# Patient Record
Sex: FEMALE | Race: WHITE | NOT HISPANIC OR LATINO
[De-identification: names, ages, dates, MRNs, and addresses within clinical notes are randomized per-mention and may not be internally consistent; named-entity substitution may affect disease eponyms.]

---

## 2019-03-31 ENCOUNTER — TRANSCRIPTION ENCOUNTER (OUTPATIENT)
Age: 59
End: 2019-03-31

## 2019-08-19 ENCOUNTER — TRANSCRIPTION ENCOUNTER (OUTPATIENT)
Age: 59
End: 2019-08-19

## 2019-12-26 ENCOUNTER — TRANSCRIPTION ENCOUNTER (OUTPATIENT)
Age: 59
End: 2019-12-26

## 2020-08-01 ENCOUNTER — TRANSCRIPTION ENCOUNTER (OUTPATIENT)
Age: 60
End: 2020-08-01

## 2020-08-21 ENCOUNTER — TRANSCRIPTION ENCOUNTER (OUTPATIENT)
Age: 60
End: 2020-08-21

## 2023-05-09 ENCOUNTER — APPOINTMENT (OUTPATIENT)
Dept: SURGERY | Facility: CLINIC | Age: 63
End: 2023-05-09

## 2023-09-21 ENCOUNTER — NON-APPOINTMENT (OUTPATIENT)
Age: 63
End: 2023-09-21

## 2023-09-21 ENCOUNTER — APPOINTMENT (OUTPATIENT)
Dept: VASCULAR SURGERY | Facility: CLINIC | Age: 63
End: 2023-09-21
Payer: COMMERCIAL

## 2023-09-21 VITALS — SYSTOLIC BLOOD PRESSURE: 106 MMHG | DIASTOLIC BLOOD PRESSURE: 74 MMHG | HEART RATE: 79 BPM

## 2023-09-21 DIAGNOSIS — Z82.49 FAMILY HISTORY OF ISCHEMIC HEART DISEASE AND OTHER DISEASES OF THE CIRCULATORY SYSTEM: ICD-10-CM

## 2023-09-21 DIAGNOSIS — M79.662 PAIN IN LEFT LOWER LEG: ICD-10-CM

## 2023-09-21 DIAGNOSIS — I82.409 ACUTE EMBOLISM AND THROMBOSIS OF UNSPECIFIED DEEP VEINS OF UNSPECIFIED LOWER EXTREMITY: ICD-10-CM

## 2023-09-21 DIAGNOSIS — I82.890 ACUTE EMBOLISM AND THROMBOSIS OF OTHER SPECIFIED VEINS: ICD-10-CM

## 2023-09-21 DIAGNOSIS — Z78.9 OTHER SPECIFIED HEALTH STATUS: ICD-10-CM

## 2023-09-21 DIAGNOSIS — I82.492 ACUTE EMBOLISM AND THROMBOSIS OF OTHER SPECIFIED DEEP VEIN OF LEFT LOWER EXTREMITY: ICD-10-CM

## 2023-09-21 PROCEDURE — 99203 OFFICE O/P NEW LOW 30 MIN: CPT

## 2023-09-21 PROCEDURE — 93971 EXTREMITY STUDY: CPT

## 2023-09-21 RX ORDER — APIXABAN 5 MG/1
5 TABLET, FILM COATED ORAL
Qty: 180 | Refills: 2 | Status: ACTIVE | COMMUNITY
Start: 2023-09-21 | End: 1900-01-01

## 2023-11-16 ENCOUNTER — APPOINTMENT (OUTPATIENT)
Dept: VASCULAR SURGERY | Facility: CLINIC | Age: 63
End: 2023-11-16

## 2023-11-16 VITALS — BODY MASS INDEX: 29.73 KG/M2 | WEIGHT: 185 LBS | HEIGHT: 66 IN

## 2023-11-16 RX ORDER — APIXABAN 5 MG/1
5 TABLET, FILM COATED ORAL
Refills: 0 | Status: DISCONTINUED | COMMUNITY
End: 2023-11-16

## 2023-11-16 RX ORDER — ASPIRIN 81 MG
81 TABLET, DELAYED RELEASE (ENTERIC COATED) ORAL
Refills: 0 | Status: DISCONTINUED | COMMUNITY
End: 2023-11-16

## 2023-12-08 ENCOUNTER — APPOINTMENT (OUTPATIENT)
Dept: VASCULAR SURGERY | Facility: CLINIC | Age: 63
End: 2023-12-08

## 2023-12-14 ENCOUNTER — APPOINTMENT (OUTPATIENT)
Dept: VASCULAR SURGERY | Facility: CLINIC | Age: 63
End: 2023-12-14

## 2024-01-04 ENCOUNTER — APPOINTMENT (OUTPATIENT)
Dept: VASCULAR SURGERY | Facility: CLINIC | Age: 64
End: 2024-01-04
Payer: COMMERCIAL

## 2024-01-04 PROCEDURE — 99213 OFFICE O/P EST LOW 20 MIN: CPT

## 2024-01-04 PROCEDURE — 93971 EXTREMITY STUDY: CPT

## 2024-01-30 NOTE — PHYSICAL EXAM
[Normal Breath Sounds] : Normal breath sounds [2+] : left 2+ [Ankle Swelling Bilaterally] : bilaterally  [Alert] : alert [Oriented to Person] : oriented to person [Oriented to Place] : oriented to place [Calm] : calm [Oriented to Time] : oriented to time [Ankle Swelling (On Exam)] : not present [de-identified] : NAD [de-identified] : NCAT [de-identified] : Supple [de-identified] : Soft, nontender, nondistended, no palpable masses

## 2024-01-30 NOTE — HISTORY OF PRESENT ILLNESS
[FreeTextEntry1] : 63-year-old female here for evaluation of recent diagnosis of left leg questionable SVT. She is traveling to [place], [facility], about 10 days ago, at which point she developed left calf pain and discomfort while on flight.  The symptoms persisted, and she underwent evaluation consisting of a lower extremity duplex.  She was told that she had an SVT, and she was subsequently evaluated by a cardiologist.  The cardiologist recommended that she be anticoagulated with Eliquis, as well as an injectable anticoagulant, which she is unsure the name of.  She did not take the injectable anticoagulant, but did begin Eliquis, however only 5 mg daily, as well as a baby aspirin.  She reports that her calf discomfort is improved, not fully resolved.  She denies lower extremity swelling currently, or previously when her initial symptoms started. She denies a prior history of DVT. [de-identified] : 1/4/23 - The patient returns in follow up for a left lower extremity SVT. The patient is doing well and her symptoms have resolved. The patient has discontinued anticoagulation. She presents to undergo a left lower extremity venous duplex.

## 2024-01-30 NOTE — ASSESSMENT
[FreeTextEntry1] : 63-year-old female with right calf discomfort but no swelling associated with air travel, underwent a venous duplex in [facility], where she was advised to be anticoagulated with Eliquis 5 mg daily. Since the initial event, her left calf discomfort is improved, and she does not have edema. We repeated her duplex in the office which revealed SSV SVT along the mid calf, not near the saphenous popliteal junction. It is possible that she required anticoagulation due to either long segment SSV involvement, or proximity to the SPJ, and this could be regressed after treatment with AC. As such, rather than discontinuing AC at this time, we will continue AC for 6 weeks, and performed clinical and radiographic follow-up. 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 She will follow-up with me in 6 weeks for repeat evaluation.  On follow up, the patient is doing well. She underwent a left lower extremity venous duplex that demonstrated resolution of her SVT. I recommended she follow up with a hematologist for further evaluation of suspected thrombocytosis. I emphasized the importance of obtaining a PCP.

## 2024-01-30 NOTE — PROCEDURE
[FreeTextEntry1] : Left lower extremity venous duplex performed at our [facility] vascular lab -   No DVT, SVT resolved.  Elongated Baker's cyst identified

## 2024-01-30 NOTE — END OF VISIT
[FreeTextEntry3] : All medical record entries made by the Sugeyibe were at my, RONNIE BRYANT, direction and personally dictated by me on 01/04/2024. I have reviewed the chart and agree that the record accurately reflects my personal performance of the history, physical exam, assessment and plan. I have also personally directed, reviewed, and agreed with the chart.

## 2025-03-21 ENCOUNTER — NON-APPOINTMENT (OUTPATIENT)
Age: 65
End: 2025-03-21

## 2025-03-21 ENCOUNTER — APPOINTMENT (OUTPATIENT)
Age: 65
End: 2025-03-21
Payer: COMMERCIAL

## 2025-03-21 PROCEDURE — 92286 ANT SGM IMG I&R SPECLR MIC: CPT

## 2025-03-21 PROCEDURE — 92134 CPTRZ OPH DX IMG PST SGM RTA: CPT

## 2025-03-21 PROCEDURE — 92014 COMPRE OPH EXAM EST PT 1/>: CPT

## 2025-03-21 PROCEDURE — 92083 EXTENDED VISUAL FIELD XM: CPT

## 2025-03-21 PROCEDURE — 92025 CPTRIZED CORNEAL TOPOGRAPHY: CPT

## 2025-03-21 PROCEDURE — 92136 OPHTHALMIC BIOMETRY: CPT | Mod: RT

## 2025-04-11 ENCOUNTER — NON-APPOINTMENT (OUTPATIENT)
Age: 65
End: 2025-04-11

## 2025-04-11 ENCOUNTER — APPOINTMENT (OUTPATIENT)
Age: 65
End: 2025-04-11
Payer: COMMERCIAL

## 2025-04-11 PROCEDURE — 92202 OPSCPY EXTND ON/MAC DRAW: CPT

## 2025-04-11 PROCEDURE — 92134 CPTRZ OPH DX IMG PST SGM RTA: CPT

## 2025-04-11 PROCEDURE — 92014 COMPRE OPH EXAM EST PT 1/>: CPT

## 2025-05-27 ENCOUNTER — APPOINTMENT (OUTPATIENT)
Age: 65
End: 2025-05-27